# Patient Record
Sex: MALE | Race: BLACK OR AFRICAN AMERICAN
[De-identification: names, ages, dates, MRNs, and addresses within clinical notes are randomized per-mention and may not be internally consistent; named-entity substitution may affect disease eponyms.]

---

## 2017-01-10 ENCOUNTER — HOSPITAL ENCOUNTER (EMERGENCY)
Dept: HOSPITAL 10 - FTE | Age: 57
Discharge: HOME | End: 2017-01-10
Payer: COMMERCIAL

## 2017-01-10 VITALS
WEIGHT: 189.6 LBS | BODY MASS INDEX: 22.39 KG/M2 | HEIGHT: 77 IN | BODY MASS INDEX: 22.39 KG/M2 | WEIGHT: 189.6 LBS | HEIGHT: 77 IN

## 2017-01-10 DIAGNOSIS — L73.9: Primary | ICD-10-CM

## 2017-01-10 DIAGNOSIS — Z87.891: ICD-10-CM

## 2017-01-10 LAB — RPR SER QL: REACTIVE

## 2017-01-10 PROCEDURE — 99283 EMERGENCY DEPT VISIT LOW MDM: CPT

## 2017-01-10 PROCEDURE — 86592 SYPHILIS TEST NON-TREP QUAL: CPT

## 2017-01-10 PROCEDURE — 36415 COLL VENOUS BLD VENIPUNCTURE: CPT

## 2017-01-10 PROCEDURE — 87591 N.GONORRHOEAE DNA AMP PROB: CPT

## 2017-01-10 NOTE — ERD
ER Documentation


Chief Complaint


Date/Time


DATE: 1/10/17 


TIME: 10:52


Chief Complaint


GENITAL PROBLEMS





HPI


This 56-year-old male presents with some genital lesions for last 3 weeks.  It 

started approximately 1 week after unprotected intercourse.  He has some sores 

on the under side of his glans area and a lesion at the base of his penis.  

There is tenderness and slight amount of discharge ulceration.  He does have a 

history of herpes but says that it does not feel like herpes.





ROS


All systems reviewed and are negative except as per history of present illness.





Medications


Home Meds


Active Scripts


Ibuprofen* (Motrin*) 600 Mg Tab, 600 MG PO Q6, #15 TAB


   Prov:GIAN ADAMS MD         1/10/17


Sulfamethoxazole-Trimethoprim* (Bactrim* DS) 800-160 Mg Tab, 1 TAB PO BID, #20 

TAB


   Prov:GIAN ADAMS MD         1/10/17





Allergies


Allergies:  


Coded Allergies:  


     No Known Allergy (Unverified , 1/10/17)





PMhx/Soc


History of Surgery:  Yes (oral surgery)


Anesthesia Reaction:  No


Hx Neurological Disorder:  No


Hx Respiratory Disorders:  No


Hx Cardiac Disorders:  No


Hx Psychiatric Problems:  No


Hx Miscellaneous Medical Probl:  Yes (herpes)


Hx Alcohol Use:  No


Hx Substance Use:  Yes (daily Marijuana use)


Hx Tobacco Use:  No


Smoking Status:  Former smoker





Physical Exam


Vitals





Vital Signs








  Date Time  Temp Pulse Resp B/P Pulse Ox O2 Delivery O2 Flow Rate FiO2


 


1/10/17 10:03 97.6 73 18 134/89 99   








Physical Exam


Const:  [] Alert, non-ill-appearing


Head:   Atraumatic 


Eyes:    Normal Conjunctiva


ENT:    Normal External Ears, Nose and Mouth.


Neck:               Full range of motion..~ No meningismus.


Resp:    Clear to auscultation bilaterally


Cardio:    Regular rate and rhythm, no murmurs


Abd:    Soft, non tender, non distended. Normal bowel sounds


Skin:    No petechiae or rashes.  General exam shows scabbed follicular pustule 

at the base of the penis on the pubis.  There is a small open ulcerations on 

the underside of his glands which are tender.  There is no induration, erythema

, penile discharge appreciated.


Back:    No midline or flank tenderness


Ext:    No cyanosis, or edema


Neur:    Awake and alert


Psych:    Normal Mood and Affect


Results 24 hrs





 Current Medications








 Medications


  (Trade)  Dose


 Ordered  Sig/Nelida


 Route


 PRN Reason  Start Time


 Stop Time Status Last Admin


Dose Admin


 


 Azithromycin


  (Zithromax)  2,000 mg  ONCE  ONCE


 PO


   1/10/17 11:00


 1/10/17 11:01   


 











Procedures/MDM


Patient presents with genital lesions which has the clinical appearance of 

folliculitis or staph infection.  Given that he does have genital ulcers of 

uncertain etiology we treated for sepsis with 2 g Zithromax by mouth and RPR 

was obtained results pending.  Urine is also sent for gonorrhea chlamydia.  He 

will be treated with Bactrim at home instructions for further observation at 

home.  He should follow-up with primary doctor, await lab results and return 

for new or worsening symptoms such as fevers, worsening redness, new or 

worsening symptoms as directed and aftercare instructions.  Will defer 

treatment for gonorrhea currently until confirmatory test





Departure


Diagnosis:  


 Primary Impression:  


 Folliculitis


Condition:  Stable


Patient Instructions:  Folliculitis





Additional Instructions:  


Lesions appear to likely be staph infection but we will treat and test for STD.

  STD results should be back within a week.  Recheck for new or worsening 

symptoms with primary care doctor.











GIAN ADAMS MD Neo 10, 2017 10:54

## 2017-01-11 LAB — LABORATORY COMMENT REPORT: (no result)

## 2017-01-21 ENCOUNTER — HOSPITAL ENCOUNTER (EMERGENCY)
Dept: HOSPITAL 10 - FTE | Age: 57
Discharge: HOME | End: 2017-01-21
Payer: COMMERCIAL

## 2017-01-21 VITALS
HEIGHT: 77 IN | WEIGHT: 191.8 LBS | HEIGHT: 77 IN | WEIGHT: 191.8 LBS | BODY MASS INDEX: 22.65 KG/M2 | BODY MASS INDEX: 22.65 KG/M2

## 2017-01-21 DIAGNOSIS — A53.9: Primary | ICD-10-CM

## 2017-01-21 PROCEDURE — 96372 THER/PROPH/DIAG INJ SC/IM: CPT

## 2017-01-21 NOTE — ERD
ER Documentation


Chief Complaint


Date/Time


DATE: 1/21/17 


TIME: 13:51


Chief Complaint


RE CHECK





HPI


Patient is a 56-year-old male who was seen here last week and diagnosed with 

folliculitis around his genitals and he is taking Bactrim but he states getting 

no better. He is here today. The results of his lab tests. He states this all 

started after unprotected sex. He denies fever. He denies any bleeding or 

discharge from his penis. He states he has to ulcerated lesions on his penis.





ROS


All systems reviewed and are negative except as per history of present illness.





Medications


Home Meds


Active Scripts


Ibuprofen* (Motrin*) 600 Mg Tab, 600 MG PO Q6, #15 TAB


   Prov:GIAN ADAMS MD         1/10/17


Sulfamethoxazole-Trimethoprim* (Bactrim* DS) 800-160 Mg Tab, 1 TAB PO BID, #20 

TAB


   Prov:GIAN ADAMS MD         1/10/17





Allergies


Allergies:  


Coded Allergies:  


     No Known Allergy (Unverified , 1/21/17)





PMhx/Soc


History of Surgery:  Yes (oral surgery)


Anesthesia Reaction:  No


Hx Neurological Disorder:  No


Hx Respiratory Disorders:  No


Hx Cardiac Disorders:  No


Hx Psychiatric Problems:  No


Hx Miscellaneous Medical Probl:  Yes (herpes)


Hx Alcohol Use:  No


Hx Substance Use:  Yes (daily Marijuana use)


Hx Tobacco Use:  No


Smoking Status:  Unknown if ever smoked





FmHx


Family History:  No diabetes





Physical Exam


Vitals





Vital Signs








  Date Time  Temp Pulse Resp B/P Pulse Ox O2 Delivery O2 Flow Rate FiO2


 


1/21/17 11:30 98.2 92 19 136/78 100   








Physical Exam


General: well developed, well nourished, alert, nontoxic, no distress





Head: normocephalic, atraumatic





 





Neck: Supple, nontender, no lymphadenopathy, no midline tenderness





 


Oropharynx: no tonsilar erythema or edema, uvula midline, no exudates, no 

kissing tonsils, no drooling





Respiratory: Clear to auscaultation bilaterally, speaks in full sentences, no 

use of accesory muscles or labored breathing, no rales, ronchi, or wheezing





Cardiovascular: RRR, No murmurs





GI: soft, non tender, non distended, negative murphys sign, negative mcburneys 

point tenderness, no cva tenderness bilaterally, no rebound or guarding


 : On the underside of the glans of the penis there is to her lesions, 

nontender, no bleeding or drainage, there are small palpable bumps at the base, 

no lymphadenopathy


Results 24 hrs





 Current Medications








 Medications


  (Trade)  Dose


 Ordered  Sig/Nelida


 Route


 PRN Reason  Start Time


 Stop Time Status Last Admin


Dose Admin


 


 Penicillin G


 Benzathine


  (Bicillin La)  2,400,000


 units  ONCE  ONCE


 IM


   1/21/17 13:30


 1/21/17 13:31 DC 1/21/17 13:40


 











Procedures/MDM


His femoral male presents with lesion on his penis in his here for results of 

tests which showed he tested positive for syphilis. I reviewed the findings 

with Dr. Mullen by supervising physician who recommended penicillin G here in 

the emergency room. He was given this injection here. He was given outpatient 

follow-up to urology. He was instructed to avoid sexual contact until he is 

able to get his symptoms resolved and follow-up with urology. Recommended this 

patient follow up with her primary care doctor within 48 hours or return to the 

emergency room for any worsening of symptoms. However this time I do believe 

there is suitable for outpatient management. I answered all their questions and 

they agreed with the plan and were discharged home.





Departure


Diagnosis:  


 Primary Impression:  


 Syphilis


Condition:  Stable


Patient Instructions:  Syphilis


Referrals:  


ELI JC MD,JEAN MARIE SOUTH,IVON DIAZ,MATHIEU CAMPBELL,RENEA STOUT,GIOVANNY MONTGOMERY,JACQUELINE GONZALEZ,JULIA CHEATHAM,GIO CHOE=








TIA VALENZUELA SOROUSH ADAM RIOS,BECKY PARDO,JACQUELINE GONSALES,SURAJ MUÑOZ M.D.





Additional Instructions:  


Call your primary care doctor TOMORROW for an appointment during the next 1-2 

days.See the doctor sooner or return here if your condition worsens before your 

appointment time.SPECIALIST:  YOU HAVE A MEDICAL CONDITION WHICH REQUIRES YOU 

TO SEE A   SPECIALIST WITHIN THE NEXT 1-2 DAYS. PLEASE FOLLOW UP WITH YOUR 

PRIMARY PHYSICIAN FOR REFFERAL.IF YOU DO NOT HAVE A PRIMARY CARE PHYSICIAN AND/

OR YOU CAN NOT AFFORD TO SEE A PHYSICIAN THE FOLLOWING RESOURCES HAVE BEEN 

SUPPLIED TO YOU. IT IS YOUR RESPONSIBILITY TO BE SEEN BY THE SPECIALIST











FAIZAN SALAZAR PA-C Jan 21, 2017 13:56

## 2017-06-03 ENCOUNTER — HOSPITAL ENCOUNTER (EMERGENCY)
Dept: HOSPITAL 10 - E/R | Age: 57
Discharge: HOME | End: 2017-06-03
Payer: COMMERCIAL

## 2017-06-03 VITALS
HEIGHT: 77 IN | WEIGHT: 196.21 LBS | HEIGHT: 77 IN | BODY MASS INDEX: 23.17 KG/M2 | WEIGHT: 196.21 LBS | BODY MASS INDEX: 23.17 KG/M2

## 2017-06-03 DIAGNOSIS — R40.2362: ICD-10-CM

## 2017-06-03 DIAGNOSIS — Z20.2: Primary | ICD-10-CM

## 2017-06-03 DIAGNOSIS — R40.2142: ICD-10-CM

## 2017-06-03 DIAGNOSIS — R40.2252: ICD-10-CM

## 2017-06-03 LAB
ADD UMIC: YES
COLOR UR: (no result)
GLUCOSE UR STRIP-MCNC: NEGATIVE %
KETONES UR STRIP.AUTO-MCNC: NEGATIVE MG/DL
NITRITE UR QL STRIP.AUTO: NEGATIVE
RBC # UR AUTO: (no result) /UL
RBC #/AREA URNS HPF: (no result) /HPF
SQUAMOUS #/AREA URNS HPF: (no result) /[HPF]
URINE BILIRUBIN (DIP): NEGATIVE
URINE TOTAL PROTEIN (DIP): NEGATIVE
UROBILINOGEN UR STRIP-ACNC: (no result) (ref 0.1–1)
WBC # UR STRIP: NEGATIVE /UL

## 2017-06-03 PROCEDURE — 96372 THER/PROPH/DIAG INJ SC/IM: CPT

## 2017-06-03 PROCEDURE — 81001 URINALYSIS AUTO W/SCOPE: CPT

## 2017-06-03 NOTE — ERD
ER Documentation


Chief Complaint


Date/Time


DATE: 6/3/17 


TIME: 04:01


Chief Complaint


lower abd pain started 9 days ago, constipation per pt verbatim





HPI


This is a 57-year-old male who presents to the emergency room for evaluation of 

possible STD.  The patient states that he had unprotected sex approximately 11 

days ago and states that he has had mild burning with urination.  The patient 

came to the ER for evaluation and is denying any bleeding or rashes or urethral 

discharge





ROS


All systems reviewed and are negative except as per history of present illness.





Medications


Home Meds


Active Scripts


Ibuprofen* (Motrin*) 600 Mg Tab, 600 MG PO Q6, #15 TAB


   Prov:GIAN ADAMS MD         1/10/17


Sulfamethoxazole-Trimethoprim* (Bactrim* DS) 800-160 Mg Tab, 1 TAB PO BID, #20 

TAB


   Prov:GIAN ADAMS MD         1/10/17





Allergies


Allergies:  


Coded Allergies:  


     No Known Allergy (Unverified , 1/21/17)





PMhx/Soc


History of Surgery:  Yes (oral surgery)


Anesthesia Reaction:  No


Hx Neurological Disorder:  No


Hx Respiratory Disorders:  No


Hx Cardiac Disorders:  No


Hx Psychiatric Problems:  No


Hx Miscellaneous Medical Probl:  Yes (herpes)


Hx Alcohol Use:  No


Hx Substance Use:  Yes (daily Marijuana use)


Hx Tobacco Use:  No


Smoking Status:  Never smoker





Physical Exam


Vitals





Vital Signs








  Date Time  Temp Pulse Resp B/P Pulse Ox O2 Delivery O2 Flow Rate FiO2


 


6/3/17 02:25 97.4 76 18 121/74 97   








Physical Exam


Const: No acute distress


Head:   Atraumatic 


Eyes:    Normal Conjunctiva


ENT:    Normal External Ears, Nose and Mouth.


Neck:               Full range of motion..~ No meningismus.


Resp:    Clear to auscultation bilaterally


Cardio:    Regular rate and rhythm, no murmurs


Abd:    Soft, non tender, non distended. Normal bowel sounds


Skin:    No petechiae or rashes


Back:    No midline or flank tenderness


Ext:    No cyanosis, or edema


Neur:    Awake and alert


Psych:    Normal Mood and Affect


Results 24 hrs





 Laboratory Tests








Test


  6/3/17


03:17


 


Urine Color LT. YELLOW 


 


Urine Clarity CLEAR 


 


Urine pH 6.0 


 


Urine Specific Gravity 1.010 


 


Urine Ketones NEGATIVE 


 


Urine Nitrite NEGATIVE 


 


Urine Bilirubin NEGATIVE 


 


Urine Urobilinogen 0.2  E.U./dL 


 


Urine Leukocyte Esterase NEGATIVE 


 


Urine Microscopic RBC Pending 


 


Urine Microscopic WBC Pending 


 


Urine Hemoglobin TRACE 


 


Urine Glucose NEGATIVE% 


 


Urine Total Protein NEGATIVE 








 Current Medications








 Medications


  (Trade)  Dose


 Ordered  Sig/Nelida


 Route


 PRN Reason  Start Time


 Stop Time Status Last Admin


Dose Admin


 


 Ceftriaxone Sodium


  (Rocephin)  250 mg  ONCE  ONCE


 IM


   6/3/17 04:00


 6/3/17 04:01 DC  


 


 


 Metronidazole


  (Flagyl)  2,000 mg  ONCE  ONCE


 PO


   6/3/17 04:00


 6/3/17 04:01 DC  


 


 


 Azithromycin


  (Zithromax)  1,000 mg  ONCE  ONCE


 PO


   6/3/17 04:00


 6/3/17 04:01 DC  


 











Procedures/MDM


This 57-year-old male presents to the emergency room for evaluation of possible 

STD.  The patient did have unprotected sex 11 days ago.  Urinalysis is normal.  

The patient was treated with Rocephin, azithromycin, Flagyl and will be 

discharged at this time with instructions to use protection and he states that 

he is instructed his partner to also come to the emergency room.





Departure


Diagnosis:  


 Primary Impression:  


 STD exposure


Condition:  Stable











GABBIE PASCAL DO Bipin 3, 2017 04:02

## 2017-10-19 ENCOUNTER — HOSPITAL ENCOUNTER (EMERGENCY)
Dept: HOSPITAL 10 - E/R | Age: 57
Discharge: HOME | End: 2017-10-19
Payer: COMMERCIAL

## 2017-10-19 VITALS
WEIGHT: 191.8 LBS | HEIGHT: 77 IN | HEIGHT: 77 IN | BODY MASS INDEX: 22.65 KG/M2 | WEIGHT: 191.8 LBS | BODY MASS INDEX: 22.65 KG/M2

## 2017-10-19 DIAGNOSIS — A64: Primary | ICD-10-CM

## 2017-10-19 DIAGNOSIS — M54.9: ICD-10-CM

## 2017-10-19 LAB
ADD UMIC: YES
ALBUMIN SERPL-MCNC: 4.3 G/DL (ref 3.3–4.9)
ALBUMIN/GLOB SERPL: 1.3 {RATIO}
ALP SERPL-CCNC: 62 IU/L (ref 42–121)
ALT SERPL-CCNC: 31 IU/L (ref 13–69)
ANION GAP SERPL CALC-SCNC: 15 MMOL/L (ref 8–16)
AST SERPL-CCNC: 19 IU/L (ref 15–46)
BASOPHILS # BLD AUTO: 0 10^3/UL (ref 0–0.1)
BASOPHILS NFR BLD: 0.2 % (ref 0–2)
BILIRUB DIRECT SERPL-MCNC: 0 MG/DL (ref 0–0.2)
BILIRUB SERPL-MCNC: 0.3 MG/DL (ref 0.2–1.3)
BUN SERPL-MCNC: 10 MG/DL (ref 7–20)
CALCIUM SERPL-MCNC: 9.6 MG/DL (ref 8.4–10.2)
CHLORIDE SERPL-SCNC: 106 MMOL/L (ref 97–110)
CO2 SERPL-SCNC: 29 MMOL/L (ref 21–31)
COLOR UR: YELLOW
CREAT SERPL-MCNC: 1.02 MG/DL (ref 0.61–1.24)
EOSINOPHIL # BLD: 0 10^3/UL (ref 0–0.5)
EOSINOPHIL NFR BLD: 0.6 % (ref 0–7)
ERYTHROCYTE [DISTWIDTH] IN BLOOD BY AUTOMATED COUNT: 13.8 % (ref 11.5–14.5)
GLOBULIN SER-MCNC: 3.3 G/DL (ref 1.3–3.2)
GLUCOSE SERPL-MCNC: 97 MG/DL (ref 70–220)
GLUCOSE UR STRIP-MCNC: NEGATIVE MG/DL
HCT VFR BLD CALC: 42.5 % (ref 42–52)
HGB BLD-MCNC: 13.9 G/DL (ref 14–18)
KETONES UR STRIP.AUTO-MCNC: NEGATIVE MG/DL
LYMPHOCYTES # BLD AUTO: 2.5 10^3/UL (ref 0.8–2.9)
LYMPHOCYTES NFR BLD AUTO: 39.7 % (ref 15–51)
MCH RBC QN AUTO: 31.4 PG (ref 29–33)
MCHC RBC AUTO-ENTMCNC: 32.7 G/DL (ref 32–37)
MCV RBC AUTO: 95.9 FL (ref 82–101)
MONOCYTES # BLD: 0.6 10^3/UL (ref 0.3–0.9)
MONOCYTES NFR BLD: 8.7 % (ref 0–11)
NEUTROPHILS # BLD: 3.2 10^3/UL (ref 1.6–7.5)
NEUTROPHILS NFR BLD AUTO: 50.5 % (ref 39–77)
NITRITE UR QL STRIP.AUTO: NEGATIVE MG/DL
NRBC # BLD MANUAL: 0 10^3/UL (ref 0–0)
NRBC BLD AUTO-RTO: 0 /100WBC (ref 0–0)
PLATELET # BLD: 212 10^3/UL (ref 140–415)
PMV BLD AUTO: 10.3 FL (ref 7.4–10.4)
POTASSIUM SERPL-SCNC: 4.9 MMOL/L (ref 3.5–5.1)
PROT SERPL-MCNC: 7.6 G/DL (ref 6.1–8.1)
RBC # BLD AUTO: 4.43 10^6/UL (ref 4.7–6.1)
RBC # UR AUTO: (no result) MG/DL
SODIUM SERPL-SCNC: 145 MMOL/L (ref 135–144)
UR ASCORBIC ACID: NEGATIVE MG/DL
UR BILIRUBIN (DIP): NEGATIVE MG/DL
UR CLARITY: CLEAR
UR PH (DIP): 5 (ref 5–9)
UR RBC: 2 /HPF (ref 0–5)
UR SPECIFIC GRAVITY (DIP): 1.01 (ref 1–1.03)
UR TOTAL PROTEIN (DIP): NEGATIVE MG/DL
UROBILINOGEN UR STRIP-ACNC: NEGATIVE MG/DL
WBC # BLD AUTO: 6.3 10^3/UL (ref 4.8–10.8)
WBC # UR STRIP: NEGATIVE LEU/UL

## 2017-10-19 PROCEDURE — 96374 THER/PROPH/DIAG INJ IV PUSH: CPT

## 2017-10-19 PROCEDURE — 96375 TX/PRO/DX INJ NEW DRUG ADDON: CPT

## 2017-10-19 PROCEDURE — 83690 ASSAY OF LIPASE: CPT

## 2017-10-19 PROCEDURE — 36415 COLL VENOUS BLD VENIPUNCTURE: CPT

## 2017-10-19 PROCEDURE — 85025 COMPLETE CBC W/AUTO DIFF WBC: CPT

## 2017-10-19 PROCEDURE — 81001 URINALYSIS AUTO W/SCOPE: CPT

## 2017-10-19 PROCEDURE — 96372 THER/PROPH/DIAG INJ SC/IM: CPT

## 2017-10-19 PROCEDURE — 80053 COMPREHEN METABOLIC PANEL: CPT

## 2017-10-19 PROCEDURE — 87591 N.GONORRHOEAE DNA AMP PROB: CPT

## 2017-10-19 NOTE — ERD
ER Documentation


Chief Complaint


Date/Time


DATE: 10/19/17 


TIME: 06:57


Chief Complaint


RLQ ab pain x 3 weeks,constant





HPI


This is a 57-year-old male who told the triage nurse that he has been having 

right lower quadrant pain for 3 weeks.  However he states that the only reason 

he checked it is because he wants treatment for an STD.  The patient does 

describe some bilateral lumbar back pain that radiates to the front but this is 

chronic and unchanged without bowel or bladder incontinence and/or retention.  

The patient states that he had unprotected sex and is now having a tingling 

sensation and possible discharge.  The patient does have a history of STDs and 

genital herpes.  He denies current outbreak.  He would like to be treated for 

sexually transmitted disease.  He denies any right lower quadrant abdominal 

pain nausea vomiting or diarrhea.





ROS


All systems reviewed and are negative except as per history of present illness.





Allergies


Allergies:  


Coded Allergies:  


     ibuprofen (Verified  Allergy, Unknown, 10/19/17)





PMhx/Soc


History of Surgery:  Yes (oral surgery)


Anesthesia Reaction:  No


Hx Neurological Disorder:  No


Hx Respiratory Disorders:  No


Hx Cardiac Disorders:  No


Hx Psychiatric Problems:  No


Hx Miscellaneous Medical Probl:  Yes (herpes)


Hx Alcohol Use:  No


Hx Substance Use:  Yes (daily Marijuana use)


Hx Tobacco Use:  No


Smoking Status:  Never smoker





FmHx


Family History:  No diabetes





Physical Exam


Vitals





Vital Signs








  Date Time  Temp Pulse Resp B/P Pulse Ox O2 Delivery O2 Flow Rate FiO2


 


10/19/17 04:46 96.8 80 18 116/69 97   








Physical Exam


General: Well developed, well nourished, no acute distress


Head: Normocephalic, atraumatic.


Eyes: Pupils equally reactive, EOM intact


ENT: Moist mucous membranes


Neck: Supple, no lymphadenopathy


Respiratory: Lungs clear bilaterally, no distress


Cardiovascular: RRR, no murmurs, rubs, or gallops


Abdominal: Soft, non-tender, non-distended, no peritoneal signs


: No inguinal hernia or lymphadenopathy, normal descended testicles without 

focal tenderness or swelling, no penile discharge or lesions noted.


MSK: No edema, no unilateral swelling, 5/5 strength


Neurologic: Alert and oriented, moving all extremities, normal speech, no focal 

weakness, no cerebellar signs


Skin: No rash


Psych: Normal mood


Result Diagram:  


10/19/17 0511                                                                  

              10/19/17 0521





Results 24 hrs





 Laboratory Tests








Test


  10/19/17


05:11 10/19/17


05:21 10/19/17


05:30


 


White Blood Count 6.310^3/ul   


 


Red Blood Count 4.4310^6/ul   


 


Hemoglobin 13.9g/dl   


 


Hematocrit 42.5%   


 


Mean Corpuscular Volume 95.9fl   


 


Mean Corpuscular Hemoglobin 31.4pg   


 


Mean Corpuscular Hemoglobin


Concent 32.7g/dl 


  


  


 


 


Red Cell Distribution Width 13.8%   


 


Platelet Count 53424^3/UL   


 


Mean Platelet Volume 10.3fl   


 


Neutrophils % 50.5%   


 


Lymphocytes % 39.7%   


 


Monocytes % 8.7%   


 


Eosinophils % 0.6%   


 


Basophils % 0.2%   


 


Nucleated Red Blood Cells % 0.0/100WBC   


 


Neutrophils # 3.210^3/ul   


 


Lymphocytes # 2.510^3/ul   


 


Monocytes # 0.610^3/ul   


 


Eosinophils # 0.010^3/ul   


 


Basophils # 0.010^3/ul   


 


Nucleated Red Blood Cells # 0.010^3/ul   


 


Sodium Level  145mmol/L  


 


Potassium Level  4.9mmol/L  


 


Chloride Level  106mmol/L  


 


Carbon Dioxide Level  29mmol/L  


 


Anion Gap  15  


 


Blood Urea Nitrogen  10mg/dl  


 


Creatinine  1.02mg/dl  


 


Glucose Level  97mg/dl  


 


Calcium Level  9.6mg/dl  


 


Total Bilirubin  0.3mg/dl  


 


Direct Bilirubin  0.00mg/dl  


 


Indirect Bilirubin  0.3mg/dl  


 


Aspartate Amino Transf


(AST/SGOT) 


  19IU/L 


  


 


 


Alanine Aminotransferase


(ALT/SGPT) 


  31IU/L 


  


 


 


Alkaline Phosphatase  62IU/L  


 


Total Protein  7.6g/dl  


 


Albumin  4.3g/dl  


 


Globulin  3.30g/dl  


 


Albumin/Globulin Ratio  1.30  


 


Lipase  121U/L  


 


Urine Color   YELLOW 


 


Urine Clarity   CLEAR 


 


Urine pH   5.0 


 


Urine Specific Gravity   1.013 


 


Urine Ketones   NEGATIVEmg/dL 


 


Urine Nitrite   NEGATIVEmg/dL 


 


Urine Bilirubin   NEGATIVEmg/dL 


 


Urine Urobilinogen   NEGATIVEmg/dL 


 


Urine Leukocyte Esterase   NEGATIVELeu/ul 


 


Urine Microscopic RBC   2/HPF 


 


Urine Microscopic WBC   0/HPF 


 


Urine Hemoglobin   2+mg/dL 


 


Urine Glucose   NEGATIVEmg/dL 


 


Urine Total Protein   NEGATIVEmg/dl 








 Current Medications








 Medications


  (Trade)  Dose


 Ordered  Sig/Nelida


 Route


 PRN Reason  Start Time


 Stop Time Status Last Admin


Dose Admin


 


 Sodium Chloride


  (NS)  1,000 ml @ 


 1,000 mls/hr  Q1H STAT


 IV


   10/19/17 05:11


 10/19/17 06:10 DC 10/19/17 05:40


 


 


 Morphine Sulfate


  (morphine)  4 mg  ONCE  STAT


 IV


   10/19/17 05:11


 10/19/17 05:12 DC 10/19/17 05:40


 


 


 Ondansetron HCl


  (Zofran Inj)  4 mg  ONCE  STAT


 IV


   10/19/17 05:11


 10/19/17 05:12 DC 10/19/17 05:40


 


 


 Azithromycin


  (Zithromax)  1,000 mg  ONCE  STAT


 PO


   10/19/17 06:37


 10/19/17 06:40 DC  


 


 


 Ceftriaxone Sodium


  (Rocephin)  250 mg  ONCE  STAT


 IM


   10/19/17 06:37


 10/19/17 06:40 DC  


 











Procedures/MDM








LAB INTERPRETATION:


No leukocytosis, normal LFTs and lipase





MEDICAL DECISION MAKING:


The patient presents initially reporting that he has right lower quadrant 

abdominal pain.  A workup was initiated by the overnight physician based on 

this information.  However he reports to me that he does not have right lower 

quadrant abdominal pain.  He states the only reason he checked and was to have 

sexually transmitted disease treatment.  The patient has a history of STD with 

high risk sexual behavior.  I believe empiric therapy would be appropriate.  A 

urine gonorrhea and Chlamydia culture will be sent.  Patient has no evidence of 

systemic illness.





His back pain is chronic and unchanged.  The patient's low back pain is 

unlikely related to serious etiology. The patient exhibits no clinical signs or 

symptoms and has no history or risk factors to suggest cauda equina, cord 

compression, epidural abscess, epidural hematoma, acute aortic aneurysm or 

dissection.





ER COURSE:


The patient was given ceftriaxone and azithromycin.  His abdominal exam is 

benign.  The patient is safe for discharge.





I kept the patient and/or family informed of laboratory and diagnostic imaging 

results throughout the emergency room course.





DISPOSITION PLAN:


We discussed follow up with the patient's primary care doctor within 24 to 48 

hours as needed.  We also discussed return to the emergency room for worsening 

symptoms or worsening condition.





Outpatient referral: [None required]





Discharge Medications:


None required





Departure


Diagnosis:  


 Primary Impression:  


 STD (male)


 Additional Impression:  


 Chronic back pain


 Back pain location:  back pain in unspecified location  Back pain laterality:  

bilateral  Qualified Code:  M54.9 - Chronic bilateral back pain, unspecified 

back location


Condition:  Stable


Patient Instructions:  What Are Sexually Transmitted Diseases (STDs)?





Additional Instructions:  


Call your primary care doctor TOMORROW for an appointment during the next 1 

WEEK.Tell the  that you were referred from this facility.See the 

doctor sooner or return here if your  condition worsens before your appointment 

time.











WICHO FERNANDEZ MD Oct 19, 2017 07:00

## 2017-10-20 LAB — LABORATORY COMMENT REPORT: (no result)

## 2018-08-13 ENCOUNTER — HOSPITAL ENCOUNTER (EMERGENCY)
Dept: HOSPITAL 91 - FTE | Age: 58
LOS: 1 days | Discharge: LEFT BEFORE DISCHARGE | End: 2018-08-14
Payer: COMMERCIAL

## 2018-08-13 ENCOUNTER — HOSPITAL ENCOUNTER (EMERGENCY)
Age: 58
LOS: 1 days | Discharge: LEFT BEFORE DISCHARGE | End: 2018-08-14

## 2018-08-13 DIAGNOSIS — N50.89: Primary | ICD-10-CM

## 2018-08-13 LAB
URINE BLOOD (DIP) POC: (no result)
URINE PH (DIP) POC: 5.5 (ref 5–8.5)

## 2018-08-13 PROCEDURE — 81003 URINALYSIS AUTO W/O SCOPE: CPT

## 2018-08-13 PROCEDURE — 99284 EMERGENCY DEPT VISIT MOD MDM: CPT

## 2018-08-13 PROCEDURE — 96372 THER/PROPH/DIAG INJ SC/IM: CPT

## 2018-08-13 PROCEDURE — 87591 N.GONORRHOEAE DNA AMP PROB: CPT

## 2018-08-13 RX ADMIN — CEFTRIAXONE SODIUM 1 MG: 250 INJECTION, POWDER, FOR SOLUTION INTRAMUSCULAR; INTRAVENOUS at 22:48

## 2018-08-13 RX ADMIN — AZITHROMYCIN 1 MG: 250 TABLET, FILM COATED ORAL at 22:47

## 2018-08-15 LAB — LABORATORY COMMENT REPORT: (no result)

## 2018-10-05 ENCOUNTER — HOSPITAL ENCOUNTER (EMERGENCY)
Dept: HOSPITAL 91 - FTE | Age: 58
Discharge: HOME | End: 2018-10-05
Payer: COMMERCIAL

## 2018-10-05 ENCOUNTER — HOSPITAL ENCOUNTER (EMERGENCY)
Age: 58
Discharge: HOME | End: 2018-10-05

## 2018-10-05 DIAGNOSIS — L73.9: Primary | ICD-10-CM

## 2018-10-05 DIAGNOSIS — F17.210: ICD-10-CM

## 2018-10-05 LAB
ADD UMIC: YES
UR ASCORBIC ACID: NEGATIVE MG/DL
UR BILIRUBIN (DIP): NEGATIVE MG/DL
UR BLOOD (DIP): (no result) MG/DL
UR CLARITY: CLEAR
UR COLOR: YELLOW
UR GLUCOSE (DIP): NEGATIVE MG/DL
UR KETONES (DIP): NEGATIVE MG/DL
UR LEUKOCYTE ESTERASE (DIP): NEGATIVE LEU/UL
UR NITRITE (DIP): NEGATIVE MG/DL
UR PH (DIP): 6 (ref 5–9)
UR RBC: 6 /HPF (ref 0–5)
UR SPECIFIC GRAVITY (DIP): 1.01 (ref 1–1.03)
UR TOTAL PROTEIN (DIP): NEGATIVE MG/DL
UR UROBILINOGEN (DIP): NEGATIVE MG/DL
UR WBC: 0 /HPF (ref 0–5)

## 2018-10-05 PROCEDURE — 87591 N.GONORRHOEAE DNA AMP PROB: CPT

## 2018-10-05 PROCEDURE — 96372 THER/PROPH/DIAG INJ SC/IM: CPT

## 2018-10-05 PROCEDURE — 87086 URINE CULTURE/COLONY COUNT: CPT

## 2018-10-05 PROCEDURE — 81001 URINALYSIS AUTO W/SCOPE: CPT

## 2018-10-05 PROCEDURE — 99284 EMERGENCY DEPT VISIT MOD MDM: CPT

## 2018-10-05 RX ADMIN — LIDOCAINE HYDROCHLORIDE 1 ML: 10 INJECTION, SOLUTION INFILTRATION; PERINEURAL at 09:48

## 2018-10-05 RX ADMIN — AZITHROMYCIN 1 MG: 250 TABLET, FILM COATED ORAL at 09:39

## 2018-10-05 RX ADMIN — CEFTRIAXONE SODIUM 1 MG: 250 INJECTION, POWDER, FOR SOLUTION INTRAMUSCULAR; INTRAVENOUS at 09:48

## 2018-10-08 LAB — LABORATORY COMMENT REPORT: (no result)

## 2018-11-30 ENCOUNTER — HOSPITAL ENCOUNTER (EMERGENCY)
Dept: HOSPITAL 91 - FTE | Age: 58
Discharge: LEFT BEFORE DISCHARGE | End: 2018-11-30
Payer: COMMERCIAL

## 2018-11-30 ENCOUNTER — HOSPITAL ENCOUNTER (EMERGENCY)
Age: 58
Discharge: LEFT BEFORE DISCHARGE | End: 2018-11-30

## 2018-11-30 DIAGNOSIS — N34.2: Primary | ICD-10-CM

## 2018-11-30 PROCEDURE — 99284 EMERGENCY DEPT VISIT MOD MDM: CPT

## 2018-11-30 PROCEDURE — 96372 THER/PROPH/DIAG INJ SC/IM: CPT

## 2018-11-30 RX ADMIN — AZITHROMYCIN 1 MG: 250 TABLET, FILM COATED ORAL at 10:09

## 2018-11-30 RX ADMIN — CEFTRIAXONE SODIUM 1 MG: 250 INJECTION, POWDER, FOR SOLUTION INTRAMUSCULAR; INTRAVENOUS at 10:09

## 2019-02-12 ENCOUNTER — HOSPITAL ENCOUNTER (EMERGENCY)
Dept: HOSPITAL 91 - FTE | Age: 59
Discharge: HOME | End: 2019-02-12
Payer: COMMERCIAL

## 2019-02-12 DIAGNOSIS — Z20.2: ICD-10-CM

## 2019-02-12 DIAGNOSIS — Z87.891: ICD-10-CM

## 2019-02-12 DIAGNOSIS — K64.4: Primary | ICD-10-CM

## 2019-02-12 DIAGNOSIS — Q64.39: ICD-10-CM

## 2019-02-12 PROCEDURE — 87591 N.GONORRHOEAE DNA AMP PROB: CPT

## 2019-02-12 PROCEDURE — 99284 EMERGENCY DEPT VISIT MOD MDM: CPT

## 2019-02-12 PROCEDURE — 96372 THER/PROPH/DIAG INJ SC/IM: CPT

## 2019-02-12 RX ADMIN — CEFTRIAXONE SODIUM 1 MG: 250 INJECTION, POWDER, FOR SOLUTION INTRAMUSCULAR; INTRAVENOUS at 12:42

## 2019-02-12 RX ADMIN — LIDOCAINE HYDROCHLORIDE 1 ML: 10 INJECTION, SOLUTION INFILTRATION; PERINEURAL at 12:43

## 2019-02-12 RX ADMIN — AZITHROMYCIN 1 MG: 250 TABLET, FILM COATED ORAL at 12:43

## 2019-02-13 LAB — LABORATORY COMMENT REPORT: (no result)
